# Patient Record
Sex: FEMALE | Race: WHITE | NOT HISPANIC OR LATINO | Employment: OTHER | URBAN - METROPOLITAN AREA
[De-identification: names, ages, dates, MRNs, and addresses within clinical notes are randomized per-mention and may not be internally consistent; named-entity substitution may affect disease eponyms.]

---

## 2017-04-20 ENCOUNTER — TELEPHONE (OUTPATIENT)
Dept: OBSTETRICS AND GYNECOLOGY | Facility: CLINIC | Age: 37
End: 2017-04-20

## 2017-04-20 DIAGNOSIS — N94.6 SEVERE MENSTRUAL CRAMPS: Primary | ICD-10-CM

## 2017-04-20 NOTE — TELEPHONE ENCOUNTER
Pt states she has been having more intense cramping during her periods along with nausea.  It doesn't happen every period.  She mentioned this to NP last time and it was recommended she possibly start on OCPs to help with cramping.  She is coming in 5/2 to discuss her options.     Do you want her to have an US?  I wasn't sure since it doesn't happen every month.

## 2017-04-20 NOTE — TELEPHONE ENCOUNTER
Dr. Metzger's pt calling, pt states that every few periods or so she gets severe menstrual cramps. Pt would like to know if she should come in to be seen, if she should get a Rx for b.c., or what she should do. Pt's # [phone].

## 2017-04-24 NOTE — TELEPHONE ENCOUNTER
MD Cammy Cobos RN       Caller: Unspecified (4 days ago,  2:25 PM)                     Yes please      Scheduled and rescheduled MD to earlier time when US was was available.

## 2017-05-02 ENCOUNTER — OFFICE VISIT (OUTPATIENT)
Dept: OBSTETRICS AND GYNECOLOGY | Facility: CLINIC | Age: 37
End: 2017-05-02

## 2017-05-02 VITALS
HEIGHT: 61 IN | DIASTOLIC BLOOD PRESSURE: 68 MMHG | WEIGHT: 117.19 LBS | BODY MASS INDEX: 22.13 KG/M2 | SYSTOLIC BLOOD PRESSURE: 120 MMHG

## 2017-05-02 DIAGNOSIS — R19.00 PELVIC MASS IN FEMALE: ICD-10-CM

## 2017-05-02 DIAGNOSIS — R10.2 PELVIC PAIN IN FEMALE: Primary | ICD-10-CM

## 2017-05-02 PROCEDURE — 99213 OFFICE O/P EST LOW 20 MIN: CPT | Mod: PBBFAC,PN | Performed by: OBSTETRICS & GYNECOLOGY

## 2017-05-02 PROCEDURE — 99999 PR PBB SHADOW E&M-EST. PATIENT-LVL III: CPT | Mod: PBBFAC,,, | Performed by: OBSTETRICS & GYNECOLOGY

## 2017-05-02 PROCEDURE — 99214 OFFICE O/P EST MOD 30 MIN: CPT | Mod: S$PBB,,, | Performed by: OBSTETRICS & GYNECOLOGY

## 2017-05-02 NOTE — PROGRESS NOTES
Pt is 36 y.o. here for evaluation of pelvic pain. She was in the operating room 2 weeks ago the day after her period started and she had cold sweats, felt faint, and then had severe abdominal cramping.  She then had nausea and diarrhea.  She laid on floor and then vomited.  The pain is described as cramping, and is 8/10 in intensity. About every 3 periods she has the episodes that last 1-3 hours.  Pain is located in the suprapubic area without radiation. Onset is sudden. Symptoms have been off and on as described previously. Aggravating factors: none. Alleviating factors: acetaminophen, recumbency and and heating pad.  It only occurs when she is having her period.. Associated symptoms: nausea and vomiting. The patient denies dysuria, fever and melena. Risk factors for pelvic/abdominal pain include none. Patient's last menstrual period was 2017 (exact date).  Preliminary U/S shows a mass in the left adnexa/cul de sac.    OB History    Para Term  AB SAB TAB Ectopic Multiple Living   0 0 0 0 0 0 0 0 0 0           Past Medical History:   Diagnosis Date    History of anxiety      History reviewed. No pertinent surgical history.  Family History   Problem Relation Age of Onset    Breast cancer Neg Hx     Colon cancer Neg Hx     Ovarian cancer Neg Hx      Social History     Social History    Marital status: Single     Spouse name: N/A    Number of children: N/A    Years of education: N/A     Social History Main Topics    Smoking status: Passive Smoke Exposure - Never Smoker    Smokeless tobacco: None    Alcohol use Yes      Comment: social     Drug use: No    Sexual activity: Not Currently     Other Topics Concern    None     Social History Narrative     Review of patient's allergies indicates:   Allergen Reactions    Nsaids (non-steroidal anti-inflammatory drug)      Other reaction(s): swelling    Penicillin Swelling     Current Outpatient Prescriptions on File Prior to Visit   Medication  "Sig Dispense Refill    escitalopram oxalate (LEXAPRO) 10 MG tablet Take 1 tablet (10 mg total) by mouth once daily. (Patient taking differently: Take 5 mg by mouth once daily. ) 30 tablet 11    loratadine 10 mg Cap        No current facility-administered medications on file prior to visit.        Review of Systems:  General: No fever, chills, fatigue or weight loss.  Chest: No chest pain, shortness of breath, or palpitations.  Breast: No pain, masses, or nipple discharge.  Vulva: No pain, lesions, or itching.  Vagina: No relaxation, pain, itching, discharge, or lesions.  Urinary: No incontinence, nocturia, frequency, or dysuria.  Extremities:  No leg cramps, edema, or calf pain.  Neurologic: No headaches or visual changes.    Vitals:  Vitals:    05/02/17 1126   BP: 120/68   Weight: 53.1 kg (117 lb 2.8 oz)   Height: 5' 1" (1.549 m)   PainSc: 0-No pain     Body mass index is 22.14 kg/(m^2).    Physical Exam:  General:  Well developed, well nourished, and in no apparent distress.  HEENT:  Normal sclera.  No thyromegaly.  Abdomen:  Soft, nontender, nondistended, positive bowel sounds, no rebound or guarding.  External genitalia:  No lesions, erythema, rash, or other abnormalities.  Urethra:  No lesions or discharge.   Vagina:  No lesions, discharge, or tenderness.  Cervix:  No lesions, discharge, or cervical motion tenderness.   Uterus:  Normal in size and shape.  Mobile and nontender.   Adnexa:  No masses or tenderness.    Assessment and Plan:  Pelvic pain in female    Pelvic mass in female      Awaiting for final U/S reading.  May need Ct/MRI and labs.  Patient is cash pay.    "

## 2017-05-02 NOTE — MR AVS SNAPSHOT
"    [facility]  [address]  [place] [address]  Phone: [phone]  Fax: [phone]                  Miguelina Aquino   2017 10:45 AM   Office Visit    Description:  Female : 1980   Provider:  Janeen Metzger MD   Department:  [facility]           Reason for Visit     Follow-up           Diagnoses this Visit        Comments    Pelvic pain in female    -  Primary     Pelvic mass in female                To Do List           Goals (5 Years of Data)     None      Follow-Up and Disposition     Return if symptoms worsen or fail to improve.    Follow-up and Disposition History      [facility]     [facility] On Call Nurse Care Line -  Assistance  Unless otherwise directed by your provider, please contact [facility], our nurse care line that is available for  assistance.     Registered nurses in the [facility] Center provide: appointment scheduling, clinical advisement, health education, and other advisory services.  Call: [phone] (toll free)               Medications           Message regarding Medications     Verify the changes and/or additions to your medication regime listed below are the same as discussed with your clinician today.  If any of these changes or additions are incorrect, please notify your healthcare provider.             Verify that the below list of medications is an accurate representation of the medications you are currently taking.  If none reported, the list may be blank. If incorrect, please contact your healthcare provider. Carry this list with you in case of emergency.           Current Medications     escitalopram oxalate (LEXAPRO) 10 MG tablet Take 1 tablet (10 mg total) by mouth once daily.    loratadine 10 mg Cap            Clinical Reference Information           Your Vitals Were     BP Height Weight Last Period BMI    120/68 5' 1" (1.549 m) 53.1 kg (117 lb 2.8 oz) 2017 (Exact Date) 22.14 kg/m2    "   Blood Pressure          Most Recent Value    BP  120/68      Allergies as of 5/2/2017     Nsaids (Non-steroidal Anti-inflammatory Drug)    Penicillin      Immunizations Administered on Date of Encounter - 5/2/2017     None      Language Assistance Services     ATTENTION: Language assistance services are available, free of charge. Please call [phone].      ATENCIÓN: Si habla winter, tiene a harmon disposición servicios gratuitos de asistencia lingüística. Llame al [phone].     [facility] Ý: N?u b?n nói Ti?ng Vi?t, có các d?ch v? h? tr? ngôn ng? mi?n phí dành cho b?n. G?i s? [phone].         [facility] complies with applicable Federal civil rights laws and does not discriminate on the basis of race, color, national origin, age, disability, or sex.

## 2017-05-04 ENCOUNTER — TELEPHONE (OUTPATIENT)
Dept: OBSTETRICS AND GYNECOLOGY | Facility: CLINIC | Age: 37
End: 2017-05-04

## 2017-05-04 NOTE — TELEPHONE ENCOUNTER
Dr. Metzger's pt calling, pt would like her radiology report emailed to her at [email or web address].

## 2017-05-04 NOTE — TELEPHONE ENCOUNTER
----- Message from Janeen Metzger MD sent at 5/4/2017  1:38 PM CDT -----  Spoke with pt.  She will check cash pay prices around [facility].  Lisa - How much would an MRI of pelvis cost her cash pay or through insurance with [facility]?  May need to get from [facility]

## 2017-05-04 NOTE — TELEPHONE ENCOUNTER
Informed pt of Fee for service number. Pt called [facility] and got prices for with and without contrast and will call [facility] for cash pay prices to compare. Informed pt that I released u/s results to portal. Pt verbalized understanding.

## 2017-05-05 ENCOUNTER — TELEPHONE (OUTPATIENT)
Dept: OBSTETRICS AND GYNECOLOGY | Facility: CLINIC | Age: 37
End: 2017-05-05

## 2017-05-05 DIAGNOSIS — R19.00 PELVIC MASS IN FEMALE: Primary | ICD-10-CM

## 2017-05-05 NOTE — TELEPHONE ENCOUNTER
Dr. Metzger's pt calling, stating that [facility] needs the CPT codes for the pelvic MRI with and without contrast that Dr. eMtzger want her to get so that they can give her the prices. Pt states that she will be in and out of appts today so if she doesn't answer to leave the CPT code in her VM. Pt's # [phone].

## 2017-05-05 NOTE — TELEPHONE ENCOUNTER
Left voicemail to inform pt that the CPT code for MRI of the pelvis with out contrast is 05021 and with out and with contrast is 20772.

## 2017-05-10 ENCOUNTER — TELEPHONE (OUTPATIENT)
Dept: OBSTETRICS AND GYNECOLOGY | Facility: CLINIC | Age: 37
End: 2017-05-10

## 2017-05-10 NOTE — TELEPHONE ENCOUNTER
Dawit with [facility] calling regarding this pt.They need the U/S report that we have on this pt for comparison.Please fax over report [phone] If any question you can call Dawit [phone]

## 2017-05-15 ENCOUNTER — TELEPHONE (OUTPATIENT)
Dept: OBSTETRICS AND GYNECOLOGY | Facility: CLINIC | Age: 37
End: 2017-05-15

## 2017-05-15 NOTE — TELEPHONE ENCOUNTER
Left voicemail to inform pt that results were sent to the [facility] location over the weekend and that I gave them to Dr. Metzger to review first thing this morning. Dr. Metzger is currently in surgery but as soon as she reviews the results I will have her call.

## 2017-05-15 NOTE — TELEPHONE ENCOUNTER
Dr. Metzger's pt calling, pt is checking to see if we received her MRI results from [facility]. [facility] states that the results were released on May 10. Pt's # [phone]

## 2017-05-16 NOTE — TELEPHONE ENCOUNTER
Results given.  Will schedule laparoscopic right ovarian cystectomy.  MRI 5 cm right ovarian dermoid

## 2017-05-17 ENCOUNTER — TELEPHONE (OUTPATIENT)
Dept: OBSTETRICS AND GYNECOLOGY | Facility: CLINIC | Age: 37
End: 2017-05-17

## 2017-05-23 ENCOUNTER — TELEPHONE (OUTPATIENT)
Dept: OBSTETRICS AND GYNECOLOGY | Facility: CLINIC | Age: 37
End: 2017-05-23

## 2018-07-10 DIAGNOSIS — F43.20 ADJUSTMENT DISORDER, UNSPECIFIED TYPE: ICD-10-CM

## 2018-07-10 RX ORDER — ESCITALOPRAM OXALATE 10 MG/1
10 TABLET ORAL DAILY
Qty: 30 TABLET | Refills: 0 | Status: SHIPPED | OUTPATIENT
Start: 2018-07-10 | End: 2019-07-10

## 2018-09-10 DIAGNOSIS — F43.20 ADJUSTMENT DISORDER, UNSPECIFIED TYPE: ICD-10-CM

## 2018-09-10 RX ORDER — ESCITALOPRAM OXALATE 10 MG/1
10 TABLET ORAL DAILY
Qty: 30 TABLET | Refills: 0 | OUTPATIENT
Start: 2018-09-10 | End: 2019-09-10

## 2018-11-02 NOTE — TELEPHONE ENCOUNTER
Dr. Metzger's pt calling, states that [facility] prices for [facility] was $3000 and [facility] is $800 so she will like the orders sent to [facility]. Pt would like to be called once orders are sent. Pt's # [phone].   Okay to reorder contact lenses.

## 2021-01-20 ENCOUNTER — IMMUNIZATION (OUTPATIENT)
Dept: FAMILY MEDICINE | Facility: CLINIC | Age: 41
End: 2021-01-20
Payer: MEDICAID

## 2021-01-20 DIAGNOSIS — Z23 NEED FOR VACCINATION: Primary | ICD-10-CM

## 2021-01-20 PROCEDURE — 91300 COVID-19, MRNA, LNP-S, PF, 30 MCG/0.3 ML DOSE VACCINE: CPT | Mod: PBBFAC

## 2021-02-10 ENCOUNTER — IMMUNIZATION (OUTPATIENT)
Dept: FAMILY MEDICINE | Facility: CLINIC | Age: 41
End: 2021-02-10
Payer: COMMERCIAL

## 2021-02-10 DIAGNOSIS — Z23 NEED FOR VACCINATION: Primary | ICD-10-CM

## 2021-02-10 PROCEDURE — 0002A COVID-19, MRNA, LNP-S, PF, 30 MCG/0.3 ML DOSE VACCINE: CPT | Mod: PBBFAC

## 2021-02-10 PROCEDURE — 91300 COVID-19, MRNA, LNP-S, PF, 30 MCG/0.3 ML DOSE VACCINE: CPT | Mod: PBBFAC

## 2021-10-06 ENCOUNTER — IMMUNIZATION (OUTPATIENT)
Dept: INTERNAL MEDICINE | Facility: CLINIC | Age: 41
End: 2021-10-06
Payer: COMMERCIAL

## 2021-10-06 DIAGNOSIS — Z23 NEED FOR VACCINATION: Primary | ICD-10-CM

## 2021-10-06 PROCEDURE — 0003A COVID-19, MRNA, LNP-S, PF, 30 MCG/0.3 ML DOSE VACCINE: CPT | Mod: CV19,PBBFAC | Performed by: INTERNAL MEDICINE

## 2021-10-06 PROCEDURE — 91300 COVID-19, MRNA, LNP-S, PF, 30 MCG/0.3 ML DOSE VACCINE: CPT | Mod: PBBFAC | Performed by: INTERNAL MEDICINE
